# Patient Record
(demographics unavailable — no encounter records)

---

## 2025-04-14 NOTE — HISTORY OF PRESENT ILLNESS
No [de-identified] : Patient is a 33 yo female with no pertinent PMHx presenting for CPE and establish care.  Patient states she is doing well. Denies any acute complaints.   Pap smear: 3 years ago  Mammo: never  Vaccines: Tdap 11/2021, COVID x 2 Ophtho: follows regularly  Dentist: does not follow Derm: does not follow

## 2025-04-14 NOTE — PLAN
[FreeTextEntry1] : Patient is a 33 yo female with no pertinent PMHx presenting for CPE and establish care.   CPE - Routine blood work ordered  - Pap smear: 3 years ago - ordered [facility] referral  - Mammo: never  - Vaccines: Tdap 11/2021, COVID x 2 - Ophtho: follows regularly  - Dentist: does not follow - ordered referral  - Derm: does not follow - ordered referral

## 2025-04-14 NOTE — HEALTH RISK ASSESSMENT
[Good] : ~his/her~  mood as  good [No] : No [No falls in past year] : Patient reported no falls in the past year [Little interest or pleasure doing things] : 1) Little interest or pleasure doing things [Feeling down, depressed, or hopeless] : 2) Feeling down, depressed, or hopeless [0] : 2) Feeling down, depressed, or hopeless: Not at all (0) [With Family] : lives with family [Employed] : employed [] :  [# Of Children ___] : has [unfilled] children [Sexually Active] : sexually active [Feels Safe at Home] : Feels safe at home [Fully functional (bathing, dressing, toileting, transferring, walking, feeding)] : Fully functional (bathing, dressing, toileting, transferring, walking, feeding) [Fully functional (using the telephone, shopping, preparing meals, housekeeping, doing laundry, using] : Fully functional and needs no help or supervision to perform IADLs (using the telephone, shopping, preparing meals, housekeeping, doing laundry, using transportation, managing medications and managing finances) [Smoke Detector] : smoke detector [Carbon Monoxide Detector] : carbon monoxide detector [Seat Belt] :  uses seat belt [Sunscreen] : uses sunscreen [Never] : Never [NO] : No [de-identified] : no particular diet [[id number]] : 0 [Change in mental status noted] : No change in mental status noted [High Risk Behavior] : no high risk behavior [Reports changes in hearing] : Reports no changes in hearing [Reports changes in vision] : Reports no changes in vision [Reports changes in dental health] : Reports no changes in dental health [PapSmearDate] : 2021